# Patient Record
Sex: FEMALE | Race: BLACK OR AFRICAN AMERICAN
[De-identification: names, ages, dates, MRNs, and addresses within clinical notes are randomized per-mention and may not be internally consistent; named-entity substitution may affect disease eponyms.]

---

## 2018-03-20 ENCOUNTER — HOSPITAL ENCOUNTER (EMERGENCY)
Dept: HOSPITAL 36 - ER | Age: 48
Discharge: LEFT BEFORE DISCHARGE | End: 2018-03-20
Payer: MEDICARE

## 2018-03-20 DIAGNOSIS — N19: Primary | ICD-10-CM

## 2018-03-20 DIAGNOSIS — D64.9: ICD-10-CM

## 2018-03-20 LAB
ANION GAP SERPL CALC-SCNC: 11.4 MMOL/L (ref 7–16)
BASOPHILS # BLD AUTO: 0 TH/CUMM (ref 0–0.2)
BASOPHILS NFR BLD AUTO: 0.4 % (ref 0–2)
BUN SERPL-MCNC: 85 MG/DL (ref 7–25)
CALCIUM SERPL-MCNC: 9.5 MG/DL (ref 8.6–10.3)
CHLORIDE SERPL-SCNC: 108 MEQ/L (ref 98–107)
CO2 SERPL-SCNC: 20.5 MEQ/L (ref 21–31)
CREAT SERPL-MCNC: 5.2 MG/DL (ref 0.6–1.2)
EOSINOPHIL # BLD AUTO: 0.2 TH/CMM (ref 0.1–0.4)
EOSINOPHIL NFR BLD AUTO: 3.8 % (ref 0–5)
ERYTHROCYTE [DISTWIDTH] IN BLOOD BY AUTOMATED COUNT: 13.1 % (ref 11.5–20)
GLUCOSE SERPL-MCNC: 90 MG/DL (ref 70–105)
HCT VFR BLD CALC: 31.5 % (ref 41–60)
HGB BLD-MCNC: 10.4 GM/DL (ref 12–16)
LYMPHOCYTE AB SER FC-ACNC: 1.6 TH/CMM (ref 1.5–3)
LYMPHOCYTES NFR BLD AUTO: 26.3 % (ref 20–50)
MCH RBC QN AUTO: 30.8 PG (ref 27–31)
MCHC RBC AUTO-ENTMCNC: 33.1 PG (ref 28–36)
MCV RBC AUTO: 93.2 FL (ref 81–100)
MONOCYTES # BLD AUTO: 0.3 TH/CMM (ref 0.3–1)
MONOCYTES NFR BLD AUTO: 5.7 % (ref 2–10)
NEUTROPHILS # BLD: 3.9 TH/CMM (ref 1.8–8)
NEUTROPHILS NFR BLD AUTO: 63.8 % (ref 40–80)
PLATELET # BLD: 142 TH/CMM (ref 150–400)
PMV BLD AUTO: 8.8 FL
POTASSIUM SERPL-SCNC: 3.9 MEQ/L (ref 3.5–5.1)
RBC # BLD AUTO: 3.38 MIL/CMM (ref 3.8–5.1)
SODIUM SERPL-SCNC: 136 MEQ/L (ref 136–145)
WBC # BLD AUTO: 6 TH/CMM (ref 4.8–10.8)

## 2018-03-20 PROCEDURE — Z7502: HCPCS

## 2018-03-20 NOTE — ED PHYSICIAN CHART
ED Chief Complaint/HPI





- Patient Information


Date Seen:: 03/20/18


Time Seen:: 18:05


Chief Complaint:: low back pain, nausea and constipation


History of Present Illness:: 





Patient's had low back pain, nausea and intermittent constipation for 2 weeks.  

No vomiting.


Allergies:: 


 Allergies











Allergy/AdvReac Type Severity Reaction Status Date / Time


 


morphine Allergy   Verified 03/20/18 19:58


 


ondansetron Allergy   Verified 03/20/18 19:58





[From Zofran (as     





hydrochloride)]     











Vitals:: 


 Vital Signs - 8 hr











  03/20/18





  19:45


 


Temp 98.5 F


 


HR 75


 


RR 18


 


/88


 


O2 Sat % 97











Historian:: Patient


Review:: Nurse's Note Reviewed





ED Review of Systems





- Review of Systems


General/Constitutional: No fever, No chills


Skin: No skin lesions


Head: No headache


Eyes: No loss of vision


ENT: No earache


Neck: No neck pain, No swelling


Cardio Vascular: No chest pain


Pulmonary: No SOB


GI: Nausea, No vomiting, No diarrhea


G/U: No dysuria


Musculoskeletal: Back pain


Endocrine: No polyuria, No polydipsia


Psychiatric: No prior psych history


Hematopoietic: No bruising


Allergic/Immuno: Urticaria


Neurological: No syncope, No focal symptoms, No weakness





Family Medical History





- Family Member


  ** Grandmother


Ethnicity: Non-


Hx Family Diabetes: Yes





ED Labs/Radiology/EKG Results





- Lab Results


Results: 





 Laboratory Results - last 24 hr











  03/20/18 03/20/18





  20:16 21:00


 


WBC  6.0 


 


RBC  3.38 L 


 


Hgb  10.4 L 


 


Hct  31.5 L 


 


MCV  93.2 


 


MCH  30.8 


 


MCHC Differential  33.1 


 


RDW  13.1 


 


Plt Count  142 L 


 


MPV  8.8 


 


Neutrophils %  63.8 


 


Lymphocytes %  26.3 


 


Monocytes %  5.7 


 


Eosinophils %  3.8 


 


Basophils %  0.4 


 


POC Ur Pregnancy Test   Negative








 Laboratory Results - last 24 hr











  03/20/18 03/20/18 03/20/18





  20:16 20:16 20:17


 


WBC  6.0  


 


RBC  3.38 L  


 


Hgb  10.4 L  


 


Hct  31.5 L  


 


MCV  93.2  


 


MCH  30.8  


 


MCHC Differential  33.1  


 


RDW  13.1  


 


Plt Count  142 L  


 


MPV  8.8  


 


Neutrophils %  63.8  


 


Lymphocytes %  26.3  


 


Monocytes %  5.7  


 


Eosinophils %  3.8  


 


Basophils %  0.4  


 


Sodium   136 


 


Potassium   3.9 


 


Chloride   108 H 


 


Carbon Dioxide   20.5 L 


 


Anion Gap   11.4 


 


BUN   85 H* 


 


Creatinine   5.2 H* 


 


Est GFR ( Amer)   11.5 


 


Est GFR (Non-Af Amer)   9.5 


 


BUN/Creatinine Ratio   16.3 


 


Glucose   90 


 


Calcium   9.5 


 


Lipase    84 H


 


POC Ur Pregnancy Test   














  03/20/18





  21:00


 


WBC 


 


RBC 


 


Hgb 


 


Hct 


 


MCV 


 


MCH 


 


MCHC Differential 


 


RDW 


 


Plt Count 


 


MPV 


 


Neutrophils % 


 


Lymphocytes % 


 


Monocytes % 


 


Eosinophils % 


 


Basophils % 


 


Sodium 


 


Potassium 


 


Chloride 


 


Carbon Dioxide 


 


Anion Gap 


 


BUN 


 


Creatinine 


 


Est GFR ( Amer) 


 


Est GFR (Non-Af Amer) 


 


BUN/Creatinine Ratio 


 


Glucose 


 


Calcium 


 


Lipase 


 


POC Ur Pregnancy Test  Negative














ED Assessment





- Assessment


General Assessment: 





I informed the patient that she has renal failure and must be admitted to 

hospital.  However she refused admission.  She also declined to have an EKG 

done.  I explained to the patient that with renal failure the potassium can go 

high which can cause the heart to stop.  I further told the patient that if she 

were to be admitted to the hospital a reversible cause of her renal failure 

might be found so that she would not have to go on dialysis.  However patient 

still refused admission and signed out AGAINST MEDICAL ADVICE





ED Septic Shock





- .


Is Septic Shock (SBP<90, OR Lactate>4 mmol\L) present?: No





- <6hrs of presentation:


Vital Signs: 


 Vital Signs - 8 hr











  03/20/18





  19:45


 


Temp 98.5 F


 


HR 75


 


RR 18


 


/88


 


O2 Sat % 97














ED Reassessment (Disposition)





- Reassessment


Reassessment Condition:: Unchanged





- Diagnosis


Diagnosis:: 





Renal failure; anemia





- Patient Disposition


Discharge/Transfer:: Against Medical Advice


Condition at Disposition:: Stable, Unchanged

## 2018-03-21 LAB
APPEARANCE UR: CLEAR
BACTERIA #/AREA URNS HPF: (no result) /HPF
BILIRUB UR-MCNC: NEGATIVE MG/DL
COLOR UR: YELLOW
EPI CELLS URNS QL MICRO: (no result) /LPF
GLUCOSE UR STRIP-MCNC: NEGATIVE MG/DL
KETONES UR STRIP-MCNC: NEGATIVE MG/DL
LEUKOCYTE ESTERASE UR-ACNC: (no result)
MICRO URNS: YES
NITRITE UR QL STRIP: NEGATIVE
PH UR STRIP: 6 [PH] (ref 4.6–8)
PROT UR STRIP-MCNC: 100 MG/DL
RBC # UR STRIP: (no result) /UL
RBC #/AREA URNS HPF: (no result) /HPF (ref 0–5)
SP GR UR STRIP: 1.01 (ref 1–1.03)
URINALYSIS COMPLETE PNL UR: (no result)
UROBILINOGEN UR STRIP-ACNC: 0.2 E.U./DL (ref 0.2–1)
WBC #/AREA URNS HPF: (no result) /HPF (ref 0–5)